# Patient Record
Sex: FEMALE | Race: BLACK OR AFRICAN AMERICAN | NOT HISPANIC OR LATINO | URBAN - METROPOLITAN AREA
[De-identification: names, ages, dates, MRNs, and addresses within clinical notes are randomized per-mention and may not be internally consistent; named-entity substitution may affect disease eponyms.]

---

## 2018-03-05 ENCOUNTER — EMERGENCY (EMERGENCY)
Facility: HOSPITAL | Age: 58
LOS: 1 days | Discharge: HOME | End: 2018-03-05
Admitting: EMERGENCY MEDICINE
Payer: OTHER MISCELLANEOUS

## 2018-03-05 VITALS
OXYGEN SATURATION: 100 % | HEART RATE: 72 BPM | TEMPERATURE: 99 F | SYSTOLIC BLOOD PRESSURE: 140 MMHG | DIASTOLIC BLOOD PRESSURE: 77 MMHG | RESPIRATION RATE: 18 BRPM

## 2018-03-05 VITALS
SYSTOLIC BLOOD PRESSURE: 148 MMHG | OXYGEN SATURATION: 100 % | DIASTOLIC BLOOD PRESSURE: 87 MMHG | HEART RATE: 85 BPM | RESPIRATION RATE: 16 BRPM | TEMPERATURE: 98 F

## 2018-03-05 PROBLEM — Z00.00 ENCOUNTER FOR PREVENTIVE HEALTH EXAMINATION: Status: ACTIVE | Noted: 2018-03-05

## 2018-03-05 PROCEDURE — 73110 X-RAY EXAM OF WRIST: CPT | Mod: 26,RT

## 2018-03-05 PROCEDURE — 99283 EMERGENCY DEPT VISIT LOW MDM: CPT

## 2018-03-05 PROCEDURE — 73130 X-RAY EXAM OF HAND: CPT | Mod: 26,RT

## 2018-03-05 RX ORDER — ACETAMINOPHEN 500 MG
650 TABLET ORAL ONCE
Qty: 0 | Refills: 0 | Status: COMPLETED | OUTPATIENT
Start: 2018-03-05 | End: 2018-03-05

## 2018-03-05 RX ADMIN — Medication 650 MILLIGRAM(S): at 13:20

## 2018-03-05 NOTE — ED PROVIDER NOTE - PHYSICAL EXAMINATION
R Hand/Wrist: from of all digits, mild generalized tenderness to palm of hand, FROM of wrist, mild ttp to base of wrist, NVI, sensate intact, no erythema ecchymosis or swelling.
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
## 2018-03-05 NOTE — ED PROVIDER NOTE - MUSCULOSKELETAL NEGATIVE STATEMENT, MLM
+right hand/wrist pain, no back pain, no gout, no musculoskeletal pain, no neck pain, and no weakness.

## 2018-12-19 ENCOUNTER — APPOINTMENT (OUTPATIENT)
Dept: ORTHOPEDIC SURGERY | Facility: CLINIC | Age: 58
End: 2018-12-19
Payer: COMMERCIAL

## 2018-12-19 VITALS
WEIGHT: 186 LBS | HEART RATE: 67 BPM | BODY MASS INDEX: 31.76 KG/M2 | DIASTOLIC BLOOD PRESSURE: 79 MMHG | SYSTOLIC BLOOD PRESSURE: 166 MMHG | HEIGHT: 64 IN

## 2018-12-19 DIAGNOSIS — Z78.9 OTHER SPECIFIED HEALTH STATUS: ICD-10-CM

## 2018-12-19 DIAGNOSIS — Z82.61 FAMILY HISTORY OF ARTHRITIS: ICD-10-CM

## 2018-12-19 DIAGNOSIS — S56.912A STRAIN OF UNSPECIFIED MUSCLES, FASCIA AND TENDONS AT FOREARM LEVEL, LEFT ARM, INITIAL ENCOUNTER: ICD-10-CM

## 2018-12-19 PROCEDURE — 99204 OFFICE O/P NEW MOD 45 MIN: CPT

## 2018-12-19 RX ORDER — MELOXICAM 7.5 MG/1
7.5 TABLET ORAL
Qty: 30 | Refills: 1 | Status: ACTIVE | COMMUNITY
Start: 2018-12-19 | End: 1900-01-01

## 2020-03-03 ENCOUNTER — RESULT REVIEW (OUTPATIENT)
Age: 60
End: 2020-03-03

## 2023-01-13 ENCOUNTER — APPOINTMENT (OUTPATIENT)
Dept: DERMATOLOGY | Facility: CLINIC | Age: 63
End: 2023-01-13
Payer: COMMERCIAL

## 2023-01-13 PROCEDURE — 99204 OFFICE O/P NEW MOD 45 MIN: CPT

## 2023-01-13 NOTE — HISTORY OF PRESENT ILLNESS
[FreeTextEntry1] : NPV- rash [de-identified] : Jan 13 2023 10:00AM \par \par 62 year F new patient here for evaluation of rash on face x 3 months. Mostly on left cheek, but also had a few spots on forehead and nose. Went to an outside dr and given a vaseline type like cream that starts with an F, without improvement, no longer using. Recently retired, no new hobbies or exposures. Does sleep on left side, denies hair oils or conditioners getting on cheek or pillow\par \par \par PMH: denies\par Meds: denies\par All: NKDA\par No personal or family hx of skin cancer\par

## 2023-01-13 NOTE — ASSESSMENT
[FreeTextEntry1] : # Favor acneiform eruption, left cheek\par new diagnosis with uncertain prognosis\par - Education and counseling\par - Discussed it takes time to see improvement, and may get worse before better 6-8 weeks\par - Start benzoyl peroxide 5% as face wash qAM. Advised to use white towel as BPO can bleach fabrics. If not dispensed by pharmacy, patient was advised to buy OTC (ex: PanOxyl, Neutrogena Clear Pore cleanser, CeraVe Acne Foaming Cream cleanser). If drying, counseled to use every other day\par - Start clindamycin 1% lotion qAM after BPO wash\par - Start tretinoin 0.025% cream qHS. Proper medication use and side effects discussed, including skin irritation, erythema, dryness, and peeling/flaking. Start 2-3 nights weekly and uptitrate as tolerated. Counseled that it cannot be used in pregnancy. Discussed if too drying, use gentle moisturizer. If still too irritating, can hold off until next visit. \par - Start doxycycline 100 mg BID x 2 weeks. Proper medication use and side effects discussed, including GI upset and photosensitivity. Advised to take with full glass of water or meal; avoid dairy and lying down within 30 minutes after taking. Counseled that it cannot be used in pregnancy or breastfeeding.\par - Gentle skin care reviewed. Use gentle facial cleanser qHS\par - Sunscreen and photoprotection reviewed. Emphasized importance of moisturizer with SPF 30+ during day\par - avoid any comedogenic products in area, paola hair products/oils. \par \par # Acne with comedones; chronic, not at treatment goal\par - start tretinoin as above\par \par RTC 4-6 weeks

## 2023-01-13 NOTE — PHYSICAL EXAM
[FreeTextEntry3] : General:\par well appearing person in nad, alert, pleasant\par Skin:\par Left cheek with hyperpigmented papules and macules\par Forehead with small comedones\par Back clear

## 2023-02-17 ENCOUNTER — APPOINTMENT (OUTPATIENT)
Dept: DERMATOLOGY | Facility: CLINIC | Age: 63
End: 2023-02-17
Payer: COMMERCIAL

## 2023-02-17 PROCEDURE — 99214 OFFICE O/P EST MOD 30 MIN: CPT

## 2023-07-11 ENCOUNTER — APPOINTMENT (OUTPATIENT)
Dept: ORTHOPEDIC SURGERY | Facility: CLINIC | Age: 63
End: 2023-07-11
Payer: COMMERCIAL

## 2023-07-11 VITALS — WEIGHT: 197 LBS | BODY MASS INDEX: 33.63 KG/M2 | HEIGHT: 64 IN

## 2023-07-11 PROCEDURE — 73140 X-RAY EXAM OF FINGER(S): CPT | Mod: F7

## 2023-07-11 PROCEDURE — 99204 OFFICE O/P NEW MOD 45 MIN: CPT

## 2023-07-11 RX ORDER — MELOXICAM 15 MG/1
15 TABLET ORAL DAILY
Qty: 30 | Refills: 1 | Status: ACTIVE | COMMUNITY
Start: 2023-07-11 | End: 1900-01-01

## 2023-07-28 ENCOUNTER — APPOINTMENT (OUTPATIENT)
Dept: ORTHOPEDIC SURGERY | Facility: CLINIC | Age: 63
End: 2023-07-28
Payer: COMMERCIAL

## 2023-07-28 DIAGNOSIS — S63.438A: ICD-10-CM

## 2023-07-28 PROCEDURE — 99213 OFFICE O/P EST LOW 20 MIN: CPT | Mod: 25

## 2023-08-18 ENCOUNTER — APPOINTMENT (OUTPATIENT)
Dept: DERMATOLOGY | Facility: CLINIC | Age: 63
End: 2023-08-18
Payer: COMMERCIAL

## 2023-08-18 DIAGNOSIS — L81.0 POSTINFLAMMATORY HYPERPIGMENTATION: ICD-10-CM

## 2023-08-18 DIAGNOSIS — L70.8 OTHER ACNE: ICD-10-CM

## 2023-08-18 PROCEDURE — 99214 OFFICE O/P EST MOD 30 MIN: CPT

## 2023-08-18 NOTE — HISTORY OF PRESENT ILLNESS
[FreeTextEntry1] : Fu rash  [de-identified] : 62 year old F here for f/u   initial hx below 1. doing bp, tret, clinda with much improvement, hasn't had flare since, might be flaring now after being at [facility] and touching face  Hx: evaluation of rash on face x 3 months. Mostly on left cheek, but also had a few spots on forehead and nose. Went to an outside dr and given a vaseline type like cream that starts with an F, without improvement, no longer using. Recently retired, no new hobbies or exposures. Does sleep on left side, denies hair oils or conditioners getting on cheek or pillow  PMH: denies Meds: denies All: NKDA No personal or family hx of skin cancer

## 2023-08-18 NOTE — ASSESSMENT
[FreeTextEntry1] : # Favor acneiform eruption, left cheek, significantly improved but not at treatment goal - Education and counseling - c/w benzoyl peroxide 5% as face wash qAM. Advised to use white towel as BPO can bleach fabrics. If not dispensed by pharmacy, patient was advised to buy OTC (ex: PanOxyl, Neutrogena Clear Pore cleanser, CeraVe Acne Foaming Cream cleanser). If drying, counseled to use every other day - c/w clindamycin 1% lotion qAM after BPO wash over entire face  - increase to tretinoin 0.05% cream qHS over entire face. Proper medication use and side effects discussed, including skin irritation, erythema, dryness, and peeling/flaking. Start 2-3 nights weekly and uptitrate as tolerated. Counseled that it cannot be used in pregnancy. Discussed if too drying, use gentle moisturizer. If cannot tolerate, can go back down to tret 0.025% - Gentle skin care reviewed. Use gentle facial cleanser qHS - Sunscreen and photoprotection reviewed. Emphasized importance of moisturizer with SPF 30+ during day - avoid any comedogenic products in area, paola hair products/oils.   Post-inflammatory hyperpigmentation - Education and counseling. Advised patient that PIH takes time to resolve - Sunscreen and photoprotection reviewed. Emphasized importance of moisturizer with SPF 30+ during day  RTC 6 months or sooner prn

## 2023-08-18 NOTE — PHYSICAL EXAM
[FreeTextEntry3] : General: well appearing person in nad, alert, pleasant Skin: Left cheek with hyperpigmented macules 1 hyperpigmented papule on forehead

## 2024-04-05 ENCOUNTER — APPOINTMENT (OUTPATIENT)
Dept: DERMATOLOGY | Facility: CLINIC | Age: 64
End: 2024-04-05
Payer: COMMERCIAL

## 2024-04-05 DIAGNOSIS — L70.9 ACNE, UNSPECIFIED: ICD-10-CM

## 2024-04-05 PROCEDURE — 99213 OFFICE O/P EST LOW 20 MIN: CPT

## 2024-04-05 RX ORDER — CLINDAMYCIN PHOSPHATE 10 MG/ML
1 LOTION TOPICAL
Qty: 1 | Refills: 3 | Status: ACTIVE | OUTPATIENT
Start: 2023-01-13

## 2024-04-05 RX ORDER — BENZOYL PEROXIDE 5 G/100G
5 LIQUID TOPICAL
Qty: 1 | Refills: 10 | Status: DISCONTINUED | COMMUNITY
Start: 2023-01-13 | End: 2024-04-05

## 2024-04-05 RX ORDER — TRETINOIN 0.5 MG/G
0.05 CREAM TOPICAL
Qty: 1 | Refills: 5 | Status: DISCONTINUED | COMMUNITY
Start: 2023-08-18 | End: 2024-04-05

## 2024-04-05 RX ORDER — TRETINOIN 0.25 MG/G
0.03 CREAM TOPICAL
Qty: 45 | Refills: 8 | Status: ACTIVE | COMMUNITY
Start: 2023-01-13 | End: 1900-01-01

## 2024-04-05 RX ORDER — DOXYCYCLINE HYCLATE 100 MG/1
100 CAPSULE ORAL
Qty: 14 | Refills: 0 | Status: DISCONTINUED | COMMUNITY
Start: 2023-01-13 | End: 2024-04-05

## 2024-04-05 NOTE — HISTORY OF PRESENT ILLNESS
[FreeTextEntry1] : Fu rash  [de-identified] : 63 year old F here for f/u   1. acneiform eruption on face- since LV using bp, tret, clinda with much improvement. Stopped using in March as products had   Hx: evaluation of rash on face x 3 months. Mostly on left cheek, but also had a few spots on forehead and nose. Went to an outside dr and given a vaseline type like cream that starts with an F, without improvement, no longer using. Recently retired, no new hobbies or exposures. Does sleep on left side, denies hair oils or conditioners getting on cheek or pillow  PMH: denies Meds: denies All: NKDA No personal or family hx of skin cancer

## 2024-04-05 NOTE — ASSESSMENT
[FreeTextEntry1] :  #Hx of acneiform eruption with recent flare, now resolving  uncertain clinical course At this point, recommend maintenance tretinoin - tretinoin 0.025% cream - pea sized amount diffusely over face at night. Start 2 nights a week for 1st two weeks and increase gradually as tolerated to minimize side effects of dryness, irritation.  - topical clindamycin 1% as spot tx -  gentle skin care and emollients  RTC if worsening

## 2024-08-30 ENCOUNTER — APPOINTMENT (OUTPATIENT)
Dept: ORTHOPEDIC SURGERY | Facility: CLINIC | Age: 64
End: 2024-08-30

## 2024-08-30 VITALS — HEIGHT: 64 IN | BODY MASS INDEX: 31.24 KG/M2 | WEIGHT: 183 LBS

## 2024-08-30 DIAGNOSIS — M65.342 TRIGGER FINGER, LEFT RING FINGER: ICD-10-CM

## 2024-08-30 PROCEDURE — 20550 NJX 1 TENDON SHEATH/LIGAMENT: CPT | Mod: LT

## 2024-08-30 PROCEDURE — 99214 OFFICE O/P EST MOD 30 MIN: CPT | Mod: 25

## 2025-02-18 ENCOUNTER — APPOINTMENT (OUTPATIENT)
Dept: ORTHOPEDIC SURGERY | Facility: CLINIC | Age: 65
End: 2025-02-18
Payer: COMMERCIAL

## 2025-02-18 DIAGNOSIS — M65.342 TRIGGER FINGER, LEFT RING FINGER: ICD-10-CM

## 2025-02-18 PROCEDURE — 99214 OFFICE O/P EST MOD 30 MIN: CPT | Mod: 25

## 2025-02-21 ENCOUNTER — NON-APPOINTMENT (OUTPATIENT)
Age: 65
End: 2025-02-21

## 2025-03-13 ENCOUNTER — OUTPATIENT (OUTPATIENT)
Dept: OUTPATIENT SERVICES | Facility: HOSPITAL | Age: 65
LOS: 1 days | End: 2025-03-13

## 2025-03-13 VITALS — HEIGHT: 63 IN | WEIGHT: 179.9 LBS

## 2025-03-13 DIAGNOSIS — M65.342 TRIGGER FINGER, LEFT RING FINGER: ICD-10-CM

## 2025-03-13 DIAGNOSIS — Z98.890 OTHER SPECIFIED POSTPROCEDURAL STATES: Chronic | ICD-10-CM

## 2025-03-13 DIAGNOSIS — I10 ESSENTIAL (PRIMARY) HYPERTENSION: ICD-10-CM

## 2025-03-13 DIAGNOSIS — G47.33 OBSTRUCTIVE SLEEP APNEA (ADULT) (PEDIATRIC): ICD-10-CM

## 2025-03-13 DIAGNOSIS — M65.30 TRIGGER FINGER, UNSPECIFIED FINGER: ICD-10-CM

## 2025-03-13 NOTE — H&P PST ADULT - PROBLEM SELECTOR PLAN 1
Patient is tentatively scheduled  for left ring finger trigger release with Dr Cardenas- 03/20/25.    Pre-op instructions provided. Pt given verbal and written instructions with teach back on chlorhexidine wash and pepcid. Pt verbalized understanding with return demonstration.    no labs warranted for this procedure.

## 2025-03-13 NOTE — H&P PST ADULT - LAST STRESS TEST
2025 -Nuclear stress test- completed last week with cardiologist- pending results/ indications elevated BP

## 2025-03-13 NOTE — H&P PST ADULT - HISTORY OF PRESENT ILLNESS
64 year old female, presents to PST, with pre op dx-  trigger finger left ring finger, for pre op evaluation prior to scheduled left ring finger trigger release with Dr Cardenas. Patient reports of chronic left ring finger trigger digit, c/o of stiffness, pain and discomfort, s/p CSI x 1 to Left Ring Finger Flexor Tendon Sheath on 8/30/2024.  ?

## 2025-03-13 NOTE — H&P PST ADULT - NSICDXPASTMEDICALHX_GEN_ALL_CORE_FT
PAST MEDICAL HISTORY:  Foot fracture, left     Hypertension     Left trigger finger     Obstructive sleep apnea

## 2025-03-13 NOTE — H&P PST ADULT - PROBLEM SELECTOR PLAN 3
Onset:  Started 6/24 - took covid test on 6/29 - pharmacy test  Location / description: + covid test on 6/29/22.  Improving cough.  Cough with scant amount phlegm.  Denies fever, chest pain, wheezing, or shortness of breath.  Precipitating Factors: was in [place].  Started feeling sick 6/24/22 - thought it was a cold.  Had [place] with air [place]  Pain Scale (1-10), 10 highest: 0/10  Associated Symptoms: none  What  improves / worsens symptoms: nothing taken  Symptom specific medications: none  Recent visits (last 3-4 weeks) for same reason or recent surgery:  none    PLAN:  Paged Provider     5/12/22 - GFR = 53  Paged Dr. Luna - paxlovid - renal dosing - ordered.  Prescription e-scribed per department policy. Patient advised    Patient/Caller agrees to follow recommendations.    Reason for Disposition  • HIGH RISK for severe COVID complications (e.g., weak immune system, age > 64 years, obesity with BMI > 25, pregnant, chronic lung disease or other chronic medical condition)  (Exception: Already seen by PCP and no new or worsening symptoms.)    Protocols used: CORONAVIRUS (COVID-19) DIAGNOSED OR FPRYLOJET-H-AH    Call Back#:  [phone]  Contact:   Clarice DRUMMOND  MRN:   [id number]  Reason:     + covid    Pharmacy called back - paxlovid cannot be given wtih xaralto.  Medication discontinued and patient advised.       JAYY precautions

## 2025-03-13 NOTE — H&P PST ADULT - NSICDXFAMILYHX_GEN_ALL_CORE_FT
FAMILY HISTORY:  Father  Still living? Unknown  Family history of pancreatic cancer, Age at diagnosis: Age Unknown

## 2025-03-13 NOTE — H&P PST ADULT - CARDIOVASCULAR COMMENTS
HTN, reports of fluctuating BP - , follows up with cardiologist  recently- completed nuclear stress test - last week pending results

## 2025-03-20 ENCOUNTER — APPOINTMENT (OUTPATIENT)
Dept: ORTHOPEDIC SURGERY | Facility: AMBULATORY SURGERY CENTER | Age: 65
End: 2025-03-20

## 2025-03-20 ENCOUNTER — TRANSCRIPTION ENCOUNTER (OUTPATIENT)
Age: 65
End: 2025-03-20

## 2025-03-20 ENCOUNTER — OUTPATIENT (OUTPATIENT)
Dept: OUTPATIENT SERVICES | Facility: HOSPITAL | Age: 65
LOS: 1 days | Discharge: HOME | End: 2025-03-20
Payer: COMMERCIAL

## 2025-03-20 VITALS
HEIGHT: 63 IN | OXYGEN SATURATION: 100 % | SYSTOLIC BLOOD PRESSURE: 158 MMHG | WEIGHT: 179.9 LBS | DIASTOLIC BLOOD PRESSURE: 84 MMHG | RESPIRATION RATE: 16 BRPM | TEMPERATURE: 98 F | HEART RATE: 69 BPM

## 2025-03-20 VITALS
TEMPERATURE: 98 F | RESPIRATION RATE: 14 BRPM | OXYGEN SATURATION: 100 % | SYSTOLIC BLOOD PRESSURE: 146 MMHG | HEART RATE: 64 BPM | DIASTOLIC BLOOD PRESSURE: 77 MMHG

## 2025-03-20 DIAGNOSIS — Z98.890 OTHER SPECIFIED POSTPROCEDURAL STATES: Chronic | ICD-10-CM

## 2025-03-20 DIAGNOSIS — M65.342 TRIGGER FINGER, LEFT RING FINGER: ICD-10-CM

## 2025-03-20 PROCEDURE — 26055 INCISE FINGER TENDON SHEATH: CPT | Mod: F3

## 2025-03-20 RX ORDER — SODIUM CHLORIDE 9 G/1000ML
1000 INJECTION, SOLUTION INTRAVENOUS
Refills: 0 | Status: ACTIVE | OUTPATIENT
Start: 2025-03-20 | End: 2026-02-16

## 2025-03-20 RX ORDER — HYDROCHLOROTHIAZIDE 50 MG/1
1 TABLET ORAL
Refills: 0 | DISCHARGE

## 2025-03-20 NOTE — ASU DISCHARGE PLAN (ADULT/PEDIATRIC) - ASU DC SPECIAL INSTRUCTIONSFT
- Follow instructions sheet emailed to you by Dr. Cardenas  - Please follow up with Dr. Cardenas in his office in 10-14 days, call office for appt

## 2025-03-20 NOTE — ASU PREOPERATIVE ASSESSMENT, ADULT (IPARK ONLY) - FALL HARM RISK - UNIVERSAL INTERVENTIONS
Bed in lowest position, wheels locked, appropriate side rails in place/Call bell, personal items and telephone in reach/Instruct patient to call for assistance before getting out of bed or chair/Non-slip footwear when patient is out of bed/Pillow to call system/Physically safe environment - no spills, clutter or unnecessary equipment/Purposeful Proactive Rounding/Room/bathroom lighting operational, light cord in reach

## 2025-03-20 NOTE — ASU DISCHARGE PLAN (ADULT/PEDIATRIC) - CARE PROVIDER_API CALL
Nicolas Cardenas  Sur[facility]  [address], [address]  [place] [address]  Phone: [phone]  Fax: [phone]  Follow Up Time:

## 2025-03-20 NOTE — ASU DISCHARGE PLAN (ADULT/PEDIATRIC) - NURSING INSTRUCTIONS
You were given IV Tylenol (1000mg) for pain management in the Operating Room.  Please do NOT take any additonal tylenol/acetaminophen products (Percocet, Vicodin, Excedrin) for the next 6-8 hours (after 1:30PM). Please do not take tylenol AND percocet/vicodin/excedrin as narcotic prescription already contains tylenol.     When taking pain/narcotic medications - take with food as it can cause nausea if taken on an empty stomach, and know it may cause constipation. To prevent constipation increase fluids and fiber in diet. You may take stool softeners (such as Colace) and follow directions as printed on bottle. - Do NOT drive while on narcotics.

## 2025-03-20 NOTE — ASU DISCHARGE PLAN (ADULT/PEDIATRIC) - FINANCIAL ASSISTANCE
[facility] provides services at a reduced cost to those who are determined to be eligible through [facility]’s financial assistance program. Information regarding [facility]’s financial assistance program can be found by going to [email or web address] or by calling [phone].

## 2025-03-20 NOTE — ASU DISCHARGE PLAN (ADULT/PEDIATRIC) - NS MD DC FALL RISK RISK
Patient has been scheduled for the covid infusion on Friday, 4/2, at 8:30 am   Please call the patient on Saturday, 4/3, for a follow up  Infusion appointments require a follow up the following day  Thank you 
Visit completed for patient
For information on Fall & Injury Prevention, visit: [email or web address] OR  [email or web address] OR  https://www.cdc.gov/steadi/patient.html

## 2025-04-03 ENCOUNTER — NON-APPOINTMENT (OUTPATIENT)
Age: 65
End: 2025-04-03

## 2025-04-04 ENCOUNTER — APPOINTMENT (OUTPATIENT)
Dept: ORTHOPEDIC SURGERY | Facility: CLINIC | Age: 65
End: 2025-04-04
Payer: COMMERCIAL

## 2025-04-04 ENCOUNTER — NON-APPOINTMENT (OUTPATIENT)
Age: 65
End: 2025-04-04

## 2025-04-04 DIAGNOSIS — M65.342 TRIGGER FINGER, LEFT RING FINGER: ICD-10-CM

## 2025-04-04 PROCEDURE — 99024 POSTOP FOLLOW-UP VISIT: CPT

## 2025-04-04 NOTE — ED ADULT TRIAGE NOTE - BP NONINVASIVE DIASTOLIC (MM HG)
Deborah,   87 Patient

## 2025-04-05 PROBLEM — S92.902A UNSPECIFIED FRACTURE OF LEFT FOOT, INITIAL ENCOUNTER FOR CLOSED FRACTURE: Chronic | Status: ACTIVE | Noted: 2025-03-13

## 2025-04-05 PROBLEM — I10 ESSENTIAL (PRIMARY) HYPERTENSION: Chronic | Status: ACTIVE | Noted: 2025-03-13

## 2025-04-24 ENCOUNTER — NON-APPOINTMENT (OUTPATIENT)
Age: 65
End: 2025-04-24

## 2025-05-06 ENCOUNTER — APPOINTMENT (OUTPATIENT)
Dept: ORTHOPEDIC SURGERY | Facility: CLINIC | Age: 65
End: 2025-05-06

## 2025-07-18 ENCOUNTER — NON-APPOINTMENT (OUTPATIENT)
Age: 65
End: 2025-07-18

## 2025-08-18 ENCOUNTER — NON-APPOINTMENT (OUTPATIENT)
Age: 65
End: 2025-08-18

## (undated) DEVICE — SOL IRR POUR NS 0.9% 500ML

## (undated) DEVICE — TOURNIQUET CUFF 18" DUAL PORT SINGLE BLADDER LUER LOCK (BLACK)

## (undated) DEVICE — DRSG COBAN 2" LF STERILE

## (undated) DEVICE — WARMING BLANKET LOWER ADULT

## (undated) DEVICE — PREP CHLORAPREP HI-LITE ORANGE 26ML

## (undated) DEVICE — BIPOLAR FORCEP KIRWAN JEWELERS STR 4" X 0.4MM W 12FT CORD (GREEN)

## (undated) DEVICE — SUT MONOCRYL 5-0 18" P-3 UNDYED

## (undated) DEVICE — PACK HAND

## (undated) DEVICE — VENODYNE/SCD SLEEVE CALF MEDIUM

## (undated) DEVICE — DRSG DERMABOND 0.7ML

## (undated) DEVICE — GLV 7 PROTEXIS (WHITE)

## (undated) DEVICE — DRSG STERISTRIPS 0.5 X 4"

## (undated) DEVICE — SUT ETHILON 4-0 18" PS-2